# Patient Record
Sex: MALE | Race: WHITE
[De-identification: names, ages, dates, MRNs, and addresses within clinical notes are randomized per-mention and may not be internally consistent; named-entity substitution may affect disease eponyms.]

---

## 2018-02-20 ENCOUNTER — HOSPITAL ENCOUNTER (EMERGENCY)
Dept: HOSPITAL 5 - ED | Age: 33
Discharge: STILL ADMITTED OR ADMINISTRATIVE | End: 2018-02-20
Payer: SELF-PAY

## 2018-02-20 VITALS — DIASTOLIC BLOOD PRESSURE: 79 MMHG | SYSTOLIC BLOOD PRESSURE: 119 MMHG

## 2018-02-20 DIAGNOSIS — R07.89: Primary | ICD-10-CM

## 2018-02-20 DIAGNOSIS — E78.5: ICD-10-CM

## 2018-02-20 DIAGNOSIS — I10: ICD-10-CM

## 2018-02-20 DIAGNOSIS — R53.1: ICD-10-CM

## 2018-02-20 LAB
ALBUMIN SERPL-MCNC: 4.2 G/DL (ref 3.9–5)
ALT SERPL-CCNC: 43 UNITS/L (ref 7–56)
APTT BLD: 27.2 SEC. (ref 24.2–36.6)
BASOPHILS # (AUTO): 0.1 K/MM3 (ref 0–0.1)
BASOPHILS NFR BLD AUTO: 0.6 % (ref 0–1.8)
BUN SERPL-MCNC: 14 MG/DL (ref 9–20)
BUN/CREAT SERPL: 18 %
CALCIUM SERPL-MCNC: 9.1 MG/DL (ref 8.4–10.2)
EOSINOPHIL # BLD AUTO: 0.1 K/MM3 (ref 0–0.4)
EOSINOPHIL NFR BLD AUTO: 1.1 % (ref 0–4.3)
HCT VFR BLD CALC: 48.3 % (ref 35.5–45.6)
HEMOLYSIS INDEX: 31
HGB BLD-MCNC: 16.6 GM/DL (ref 11.8–15.2)
INR PPP: 0.86 (ref 0.87–1.13)
LYMPHOCYTES # BLD AUTO: 1.8 K/MM3 (ref 1.2–5.4)
LYMPHOCYTES NFR BLD AUTO: 14.6 % (ref 13.4–35)
MCH RBC QN AUTO: 30 PG (ref 28–32)
MCHC RBC AUTO-ENTMCNC: 34 % (ref 32–34)
MCV RBC AUTO: 87 FL (ref 84–94)
MONOCYTES # (AUTO): 0.8 K/MM3 (ref 0–0.8)
MONOCYTES % (AUTO): 6.7 % (ref 0–7.3)
PLATELET # BLD: 192 K/MM3 (ref 140–440)
RBC # BLD AUTO: 5.53 M/MM3 (ref 3.65–5.03)

## 2018-02-20 PROCEDURE — 85025 COMPLETE CBC W/AUTO DIFF WBC: CPT

## 2018-02-20 PROCEDURE — 85730 THROMBOPLASTIN TIME PARTIAL: CPT

## 2018-02-20 PROCEDURE — 93010 ELECTROCARDIOGRAM REPORT: CPT

## 2018-02-20 PROCEDURE — 85610 PROTHROMBIN TIME: CPT

## 2018-02-20 PROCEDURE — 70450 CT HEAD/BRAIN W/O DYE: CPT

## 2018-02-20 PROCEDURE — 96374 THER/PROPH/DIAG INJ IV PUSH: CPT

## 2018-02-20 PROCEDURE — 80053 COMPREHEN METABOLIC PANEL: CPT

## 2018-02-20 PROCEDURE — 85379 FIBRIN DEGRADATION QUANT: CPT

## 2018-02-20 PROCEDURE — 36415 COLL VENOUS BLD VENIPUNCTURE: CPT

## 2018-02-20 PROCEDURE — 96375 TX/PRO/DX INJ NEW DRUG ADDON: CPT

## 2018-02-20 PROCEDURE — 84484 ASSAY OF TROPONIN QUANT: CPT

## 2018-02-20 PROCEDURE — 71045 X-RAY EXAM CHEST 1 VIEW: CPT

## 2018-02-20 PROCEDURE — 99285 EMERGENCY DEPT VISIT HI MDM: CPT

## 2018-02-20 PROCEDURE — 93005 ELECTROCARDIOGRAM TRACING: CPT

## 2018-02-20 NOTE — EVENT NOTE
Date: 02/20/18


Evaluated for L side Chest pain since am 


Patient lifts heavy objects


L costodhondral tenderness present


Troponin normal 


Imp costochondritis


F/u with cardiology as outpatient Dr Guerrier


Stress test arranged as outpatient at [facility]


Mobic 7.5 mg po Bid

## 2018-02-20 NOTE — EMERGENCY DEPARTMENT REPORT
HPI





- General


Chief Complaint: Chest Pain


Time Seen by Provider: 02/20/18 17:42





- HPI


HPI: 





Room 23





The patient is a 33-year-old male presenting with a chief complaint of chest 

pain.  The patient states for the past 5 days she's had intermittent left-sided 

chest pain described as a pinching in nature.  Patient states his pain is 

associated with nausea and diaphoresis.  Patient denies shortness of breath, 

vomiting, pleurisy or recent flights/long car trips.  The patient states last 

week difficulty speaking for approximately 2 hours and had weakness in both 

legs.  The patient states he called EMS but never went to the hospital.  The 

patient states she's never had a stress test or cardiac catheterization





Location: Left chest, see above


Duration: [See above]


Quality: Pinching


Severity: 3/10


Modifying factors: [see above]


Context: [see above]


Mode of transportation: [not driving]





ED Past Medical Hx





- Past Medical History


Previous Medical History?: Yes


Hx Hypertension: Yes


Additional medical history: pre-diabetes, hyperlipidemia, obesity





- Surgical History


Past Surgical History?: No


Hx Appendectomy: Yes


Additional Surgical History: Corneal surgery





- Family History


Family history: no significant





- Social History


Smoking Status: Never Smoker


Substance Use Type: None (denies illicit drug use), Alcohol (occasional)





ED Review of Systems


ROS: 


Stated complaint: CHEST PAIN


Other details as noted in HPI





Constitutional: diaphoresis


Respiratory: denies: shortness of breath


Cardiovascular: chest pain


Gastrointestinal: nausea.  denies: vomiting


Neurological: weakness, other (dysarthria).  denies: headache





Physical Exam





- Physical Exam


Vital Signs: 


 Vital Signs











  02/20/18





  17:16


 


Temperature 99.6 F


 


Pulse Rate 106 H


 


Respiratory 16





Rate 


 


Blood Pressure 197/89


 


O2 Sat by Pulse 96





Oximetry 











Physical Exam: 





GENERAL: The patient is well-developed well-nourished male lying on stretcher 

not appearing to be in acute distress. []


HEENT: Normocephalic.  Atraumatic.  Extraocular motions are intact.  Patient 

has moist mucous membranes.


NECK: Supple.  No meningitic signs are noted.  There is no adenopathy noted.


CHEST/LUNGS: Clear to auscultation.  There is no respiratory distress noted.


HEART/CARDIOVASCULAR: Regular.  There is no tachycardia.  There is no gallop 

rub or murmur.


ABDOMEN: Abdomen is soft, nontender.  Patient has normal bowel sounds.  There 

is no abdominal distention.


SKIN: There is no rash.  There is no edema.  There is no diaphoresis.


NEURO: The patient is awake, alert, and oriented.  The patient is cooperative.  

The patient has no focal neurologic deficits.  The patient has normal speech.  

Cranial nerves II through XII grossly intact, no drift.  Moves all extremities 

well.  Normal Sensation throughout


MUSCULOSKELETAL:There is no evidence of acute injury.





ED Course


 Vital Signs











  02/20/18





  17:16


 


Temperature 99.6 F


 


Pulse Rate 106 H


 


Respiratory 16





Rate 


 


Blood Pressure 197/89


 


O2 Sat by Pulse 96





Oximetry 














ED Medical Decision Making





- Lab Data


Result diagrams: 


 02/20/18 17:49





 02/20/18 17:49





 Laboratory Tests











  02/20/18 02/20/18 02/20/18





  17:49 17:49 17:49


 


WBC  12.5 H  


 


RBC  5.53 H  


 


Hgb  16.6 H  


 


Hct  48.3 H  


 


MCV  87  


 


MCH  30  


 


MCHC  34  


 


RDW  12.8 L  


 


Plt Count  192  


 


Lymph % (Auto)  14.6  


 


Mono % (Auto)  6.7  


 


Eos % (Auto)  1.1  


 


Baso % (Auto)  0.6  


 


Lymph #  1.8  


 


Mono #  0.8  


 


Eos #  0.1  


 


Baso #  0.1  


 


Seg Neutrophils %  77.0 H  


 


Seg Neutrophils #  9.6 H  


 


PT    12.1 L


 


INR    0.86 L


 


APTT    27.2


 


D-Dimer    177.80


 


Sodium   138 


 


Potassium   3.9 


 


Chloride   97.1 L 


 


Carbon Dioxide   27 


 


Anion Gap   18 


 


BUN   14 


 


Creatinine   0.8 


 


Estimated GFR   > 60 


 


BUN/Creatinine Ratio   18 


 


Glucose   93 


 


Calcium   9.1 


 


Total Bilirubin   0.90 


 


AST   20 


 


ALT   43 


 


Alkaline Phosphatase   107 


 


Troponin T   < 0.010 


 


Total Protein   7.0 


 


Albumin   4.2 


 


Albumin/Globulin Ratio   1.5 














- EKG Data


-: EKG Interpreted by Me


EKG shows normal: sinus rhythm


Rate: normal





- EKG Data


When compared to previous EKG there are: previous EKG unavailable


Interpretation: nonspecific ST-T wave aggie (T inversion in leads 3, aVF)





- Radiology Data


Radiology results: pending (CT head radiologist reading), image reviewed (chest 

x-ray, CT head)


interpreted by me: 





Chest x-ray-no definite focal infiltrate.  Questionable right lower lobe 

atelectasis.  No pneumothorax





CT head (read by myself)-no gross hemorrhage seen





- Differential Diagnosis


ACS, GERD, pericarditis, TIA, PE


Critical care attestation.: 


If time is entered above; I have spent that time in minutes in the direct care 

of this critically ill patient, excluding procedure time.








ED Disposition


Clinical Impression: 


 Chest pain





Disposition: [id number] OP ADMIT IP TO THIS HOSP


Is pt being admited?: Yes


Does the pt Need Aspirin: Yes


Condition: Fair


Instructions:  Chest Pain (ED)


Time of Disposition: 19:50 (hospitalist paged (Dr Veliz))

## 2018-02-20 NOTE — XRAY REPORT
FINAL REPORT



EXAM:  XR CHEST 1V AP



HISTORY:  Chest Pain 



TECHNIQUE:  Frontal portable examination of the chest



PRIORS:  None



FINDINGS:  

Limited examination due to prominent soft tissue attenuation.

There is no pulmonary consolidation, pleural effusion, or

pneumothorax. The regional skeleton is without acute pathology.

The cardiac silhouette size is slightly enlarged without evidence

of vascular congestion or pulmonary edema.  



IMPRESSION:  

No acute pulmonary disease in the visualized chest



Slight cardiomegaly

## 2018-02-20 NOTE — CAT SCAN REPORT
FINAL REPORT



PROCEDURE:  CT head without contrast. 



TECHNIQUE:  Computerized tomography of the head was performed

without contrast material.



HISTORY:  Intermittent dysarthria, weakness. 



COMPARISON:  No prior studies are available for comparison.



FINDINGS:  

The ventricles are normal in size. The gray matter and white

matter appear normal. There are no mass lesions. There is no

intracranial hemorrhage. The calvarium appears intact. The

mastoid air cells and paranasal sinuses are clear as far as

visualized. 



IMPRESSION:  

Normal study.